# Patient Record
Sex: FEMALE | URBAN - METROPOLITAN AREA
[De-identification: names, ages, dates, MRNs, and addresses within clinical notes are randomized per-mention and may not be internally consistent; named-entity substitution may affect disease eponyms.]

---

## 2019-01-17 ENCOUNTER — APPOINTMENT (RX ONLY)
Dept: URBAN - METROPOLITAN AREA CLINIC 45 | Facility: CLINIC | Age: 19
Setting detail: DERMATOLOGY
End: 2019-01-17

## 2019-01-17 DIAGNOSIS — Z79.899 OTHER LONG TERM (CURRENT) DRUG THERAPY: ICD-10-CM

## 2019-01-17 DIAGNOSIS — L81.4 OTHER MELANIN HYPERPIGMENTATION: ICD-10-CM

## 2019-01-17 DIAGNOSIS — L70.0 ACNE VULGARIS: ICD-10-CM

## 2019-01-17 PROCEDURE — ? PRESCRIPTION

## 2019-01-17 PROCEDURE — ? COUNSELING

## 2019-01-17 PROCEDURE — ? ORDER TESTS

## 2019-01-17 PROCEDURE — 99203 OFFICE O/P NEW LOW 30 MIN: CPT

## 2019-01-17 PROCEDURE — ? DIAGNOSIS COMMENT

## 2019-01-17 PROCEDURE — ? HIGH RISK MEDICATION MONITORING

## 2019-01-17 PROCEDURE — ? TREATMENT REGIMEN

## 2019-01-17 RX ORDER — PHARMACY COMPOUNDING ACCESSORY
EACH MISCELLANEOUS
Qty: 1 | Refills: 3 | Status: ERX | COMMUNITY
Start: 2019-01-17

## 2019-01-17 RX ORDER — MINOCYCLINE HYDROCHLORIDE 105 MG/1
TABLET, FILM COATED, EXTENDED RELEASE ORAL
Qty: 30 | Refills: 3 | Status: ERX | COMMUNITY
Start: 2019-01-17

## 2019-01-17 RX ORDER — SULFACETAMIDE SODIUM, SULFUR 100; 20 MG/G; MG/G
EMULSION TOPICAL
Qty: 1 | Refills: 4 | Status: ERX | COMMUNITY
Start: 2019-01-17

## 2019-01-17 RX ADMIN — MINOCYCLINE HYDROCHLORIDE: 105 TABLET, FILM COATED, EXTENDED RELEASE ORAL at 00:00

## 2019-01-17 RX ADMIN — SULFACETAMIDE SODIUM, SULFUR: 100; 20 EMULSION TOPICAL at 00:00

## 2019-01-17 RX ADMIN — Medication: at 00:00

## 2019-01-17 ASSESSMENT — LOCATION ZONE DERM: LOCATION ZONE: FACE

## 2019-01-17 ASSESSMENT — LOCATION SIMPLE DESCRIPTION DERM
LOCATION SIMPLE: RIGHT CHEEK
LOCATION SIMPLE: LEFT CHEEK

## 2019-01-17 ASSESSMENT — LOCATION DETAILED DESCRIPTION DERM
LOCATION DETAILED: LEFT CENTRAL MALAR CHEEK
LOCATION DETAILED: RIGHT CENTRAL MALAR CHEEK

## 2019-01-17 NOTE — PROCEDURE: ORDER TESTS
Performing Laboratory: [id number]
Billing Type: Third-Party Bill
Expected Date Of Service: 01/17/2019
Bill For Surgical Tray: no
Lab Facility: [id number]

## 2019-01-17 NOTE — PROCEDURE: TREATMENT REGIMEN
Plan: Location: face\\nDuration: enrolling in iPledge\\nMethod of Contraception: under skin implant and MLC\\nPrescribed: Solodyn 105mg po qd x 30 days\\n                    Avar Wash (2-5 mintues) qd x 30 days\\n                    Tretinoin Bleaching Cream qhs x 30 days\\nPharmacy: [facility]\\niPledge: [phone]\\n\\nDiscussed with patient that this is an immune mediated condition triggered with stress, lack of sleep, and also has a major genetic component\\nEducated patient on Accutane 6 month treatment course, side effects, and iPledge program/requirements (2 negative pregnancy tests 30 days apart required)\\nCommon side effects from therapy: dryness, joint pain, mood changes, headaches, nose bleeds\\nDiscussed with patient that Accutane is a Vitamin A derivative\\nDiscussed with patient that Accutane obliterates oil glands and a cure for acne vs. antibiotics which is a temporary treatment\\nDiscussed with patient that medication is processed by the liver and requires blood work to monitor liver functions\\nPt discussed that she is tired of acne and wants it gone.\\nWill enroll pt into ipledge today, discussed with pt that she has to wait 30 days until we can send in prescription.\\nWill prescribe Solodybn 105mg, Avar Wash, and Tretinoin Bleaching Cream today to help relieve inflammation until f/u.\\nLab slips given.\\n\\nIPLEDGE program and consent form discussed with patient. The side effects and warnings for the use of Accutane were discussed with the patient. She understands she can not donate blood for 1 month post tx and can not have laser tx, cosmetic surgery, waxing or peels for 6 months post treatment.\\n\\nDiscussed musculoskeletal SE’s in detail. Discussed HA’s, dry skin, and depression in detail. Patient denies depression. Denies personal or family H/O Crohns, IBS, IBD, or bloody stools.\\n\\nUnderstand the need to fill Rx in 6 days and have monthly labs and OV.\\nF/u in 1 month
Detail Level: Zone
Plan: Location: face\\nPrescribed: HQ 4% Tretinoin 0.05% compound cream QHS\\n\\nPt has hyperpigmentation on face today.\\nDiscussed with pt that this pigmentation is due to sun damage to the skin, advised patient to use SPF daily.\\nDiscussed with pt that we will start patient on HQ 4% Tretinoin 0.05% to use nightly.\\nDiscussed with pt to apply a pea size amount to face, pushing into pores. \\nDiscussed with pt to avoid areas around eyes, nasal crease, and around the lips since skin is thin and may get irritated easily.\\n\\nDiscussed with pt that it will come in a box that says keep refrigerated.\\nDiscussed with pt that they do not need to keep it refrigerated, but have to keep it in a dark place since sunlight may oxidize it.\\nDiscussed with pt that if skin becomes irritated while using cream, then pt can change frequency to every other night, or every other other night, etc.\\nDiscussed with pt that results are not immediate and may take up to a month to notice change.\\nAdvised pt to apply a moisturizing cream such as Cerave afterwards to help reestablish a healthy skin barrier.\\nF/u as needed

## 2019-01-22 ENCOUNTER — RX ONLY (OUTPATIENT)
Age: 19
Setting detail: RX ONLY
End: 2019-01-22

## 2019-01-22 RX ORDER — MINOCYCLINE HYDROCHLORIDE 100 MG/1
CAPSULE ORAL
Qty: 60 | Refills: 2 | Status: ERX | COMMUNITY
Start: 2019-01-22

## 2019-01-23 VITALS — WEIGHT: 140 LBS | HEIGHT: 64 IN

## 2019-02-21 ENCOUNTER — APPOINTMENT (RX ONLY)
Dept: URBAN - METROPOLITAN AREA CLINIC 45 | Facility: CLINIC | Age: 19
Setting detail: DERMATOLOGY
End: 2019-02-21

## 2019-02-21 DIAGNOSIS — L70.0 ACNE VULGARIS: ICD-10-CM

## 2019-02-21 DIAGNOSIS — L81.4 OTHER MELANIN HYPERPIGMENTATION: ICD-10-CM

## 2019-02-21 PROCEDURE — ? PRESCRIPTION

## 2019-02-21 PROCEDURE — ? COUNSELING

## 2019-02-21 PROCEDURE — ? DIAGNOSIS COMMENT

## 2019-02-21 PROCEDURE — 99213 OFFICE O/P EST LOW 20 MIN: CPT

## 2019-02-21 PROCEDURE — ? TREATMENT REGIMEN

## 2019-02-21 RX ORDER — SPIRONOLACTONE 100 MG/1
TABLET, FILM COATED ORAL
Qty: 30 | Refills: 4 | Status: ERX | COMMUNITY
Start: 2019-02-21

## 2019-02-21 RX ORDER — DOXYCYCLINE HYCLATE 200 MG/1
TABLET, DELAYED RELEASE ORAL
Qty: 30 | Refills: 1 | Status: ERX | COMMUNITY
Start: 2019-02-21

## 2019-02-21 RX ORDER — SULFACETAMIDE SODIUM, SULFUR 98; 48 MG/G; MG/G
LIQUID TOPICAL
Qty: 1 | Refills: 4 | Status: ERX | COMMUNITY
Start: 2019-02-21

## 2019-02-21 RX ADMIN — SULFACETAMIDE SODIUM, SULFUR: 98; 48 LIQUID TOPICAL at 21:16

## 2019-02-21 RX ADMIN — SPIRONOLACTONE: 100 TABLET, FILM COATED ORAL at 21:16

## 2019-02-21 RX ADMIN — DOXYCYCLINE HYCLATE: 200 TABLET, DELAYED RELEASE ORAL at 21:17

## 2019-02-21 ASSESSMENT — LOCATION ZONE DERM: LOCATION ZONE: FACE

## 2019-02-21 ASSESSMENT — LOCATION DETAILED DESCRIPTION DERM
LOCATION DETAILED: LEFT CENTRAL MALAR CHEEK
LOCATION DETAILED: RIGHT CENTRAL MALAR CHEEK

## 2019-02-21 ASSESSMENT — LOCATION SIMPLE DESCRIPTION DERM
LOCATION SIMPLE: RIGHT CHEEK
LOCATION SIMPLE: LEFT CHEEK

## 2019-02-21 NOTE — PROCEDURE: TREATMENT REGIMEN
Plan: Location: Face\\n*Patent is enrolled in iPledge but would not like to start Accutane today\\n\\nMethod of Contraception: under skin implant and MLC\\n\\nTreatment:  Spironolactone 100mg po QD\\n                    Doryx 200mg po QD\\n                    Plexion Cleanser (2-5 mintues) qd x 30 days\\n                    Tretinoin Bleaching Cream qhs x 30 days\\n\\nPharmacy: [facility]\\niPledge: [phone]\\n\\nPatient is here for follow up and was going to start Accutane today. \\nAfter doing some research, patient has decided that she would like to hold off on starting Accutane since she does not feel like she is ready yet. \\nPatient has been taking Minocycline BID since her last visit, since Solodyn was not affordable, and states she hasn't noticed much of an improvement. \\nHad long discussion with patient about the risks of long term use of Minocycline and discussed that this is not a regimen that we can keep her on forever. \\nFor now, we will switch to Doryx 200mg po QD and add Spironolactone to her skin regimen. \\nDiscussed with patient that at her next follow up, I have a strong feeling that she will want to start Accutane since she would have tried/failed all other medications. \\n\\nDiscussed with patient that this is an immune mediated condition triggered with stress, lack of sleep, and also has a major genetic component\\nEducated patient on Accutane 6 month treatment course, side effects, and iPledge program/requirements (2 negative pregnancy tests 30 days apart required)\\nCommon side effects from therapy: dryness, joint pain, mood changes, headaches, nose bleeds\\nDiscussed with patient that Accutane is a Vitamin A derivative\\nDiscussed with patient that Accutane obliterates oil glands and a cure for acne vs. antibiotics which is a temporary treatment\\nDiscussed with patient that medication is processed by the liver and requires blood work to monitor liver functions\\nPt discussed that she is tired of acne and wants it gone.\\nWill keep patient enrolled in ipledge today.\\nLab slips given.\\n\\nIPLEDGE program and consent form discussed with patient. The side effects and warnings for the use of Accutane were discussed with the patient. She understands she can not donate blood for 1 month post tx and can not have laser tx, cosmetic surgery, waxing or peels for 6 months post treatment.\\n\\nDiscussed musculoskeletal SE’s in detail. Discussed HA’s, dry skin, and depression in detail. Patient denies depression. Denies personal or family H/O Crohns, IBS, IBD, or bloody stools.\\n\\nUnderstand the need to fill Rx in 6 days and have monthly labs and OV.\\nF/u in 1 month
Detail Level: Zone
Plan: Location: face\\nPrescribed: HQ 4% Tretinoin 0.05% compound cream QHS\\n\\nPt has hyperpigmentation on face today.\\nDiscussed with pt that this pigmentation is due to sun damage to the skin, advised patient to use SPF daily.\\nDiscussed with pt that we will start patient on HQ 4% Tretinoin 0.05% to use nightly.\\nDiscussed with pt to apply a pea size amount to face, pushing into pores. \\nDiscussed with pt to avoid areas around eyes, nasal crease, and around the lips since skin is thin and may get irritated easily.\\n\\nDiscussed with pt that it will come in a box that says keep refrigerated.\\nDiscussed with pt that they do not need to keep it refrigerated, but have to keep it in a dark place since sunlight may oxidize it.\\nDiscussed with pt that if skin becomes irritated while using cream, then pt can change frequency to every other night, or every other other night, etc.\\nDiscussed with pt that results are not immediate and may take up to a month to notice change.\\nAdvised pt to apply a moisturizing cream such as Cerave afterwards to help reestablish a healthy skin barrier.\\nF/u as needed

## 2019-02-21 NOTE — PROCEDURE: DIAGNOSIS COMMENT
Comment: Nodulocystic Acne (Enrolled in IPLEDGE but NOT starting medication today)
Detail Level: Simple

## 2019-03-21 ENCOUNTER — RX ONLY (OUTPATIENT)
Age: 19
Setting detail: RX ONLY
End: 2019-03-21

## 2019-03-21 ENCOUNTER — APPOINTMENT (RX ONLY)
Dept: URBAN - METROPOLITAN AREA CLINIC 45 | Facility: CLINIC | Age: 19
Setting detail: DERMATOLOGY
End: 2019-03-21

## 2019-03-21 DIAGNOSIS — L70.0 ACNE VULGARIS: ICD-10-CM

## 2019-03-21 DIAGNOSIS — L81.4 OTHER MELANIN HYPERPIGMENTATION: ICD-10-CM

## 2019-03-21 PROCEDURE — 99213 OFFICE O/P EST LOW 20 MIN: CPT

## 2019-03-21 PROCEDURE — ? TREATMENT REGIMEN

## 2019-03-21 PROCEDURE — ? COUNSELING

## 2019-03-21 PROCEDURE — ? DIAGNOSIS COMMENT

## 2019-03-21 RX ORDER — SARECYCLINE HYDROCHLORIDE 100 MG/1
TABLET, COATED ORAL
Qty: 30 | Refills: 3 | Status: ERX | COMMUNITY
Start: 2019-03-21

## 2019-03-21 ASSESSMENT — LOCATION SIMPLE DESCRIPTION DERM
LOCATION SIMPLE: LEFT CHEEK
LOCATION SIMPLE: RIGHT CHEEK

## 2019-03-21 ASSESSMENT — LOCATION DETAILED DESCRIPTION DERM
LOCATION DETAILED: LEFT CENTRAL MALAR CHEEK
LOCATION DETAILED: RIGHT CENTRAL MALAR CHEEK

## 2019-03-21 ASSESSMENT — LOCATION ZONE DERM: LOCATION ZONE: FACE

## 2019-03-21 NOTE — PROCEDURE: TREATMENT REGIMEN
Plan: Location: Face\\n*Patent is enrolled in iPledge but would not like to start Accutane today\\n\\nMethod of Contraception: under skin implant and MLC\\n\\nTreatment:  Spironolactone 100mg po QD\\n                    Doryx 200mg po QD\\n                    Plexion Cleanser (2-5 mintues) qd x 30 days\\n                    Tretinoin Bleaching Cream qhs x 30 days\\n\\nPharmacy: [facility]\\niPledge: [phone]\\n\\nPatient is here for follow up and was going to start Accutane today. \\nAfter doing some research, patient has decided that she would like to hold off on starting Accutane since she does not feel like she is ready yet. \\nPatient has been taking Minocycline BID since her last visit, since Solodyn was not affordable, and states she hasn't noticed much of an improvement. \\nHad long discussion with patient about the risks of long term use of Minocycline and discussed that this is not a regimen that we can keep her on forever. \\nFor now, we will switch to Doryx 200mg po QD and add Spironolactone to her skin regimen. \\nDiscussed with patient that at her next follow up, I have a strong feeling that she will want to start Accutane since she would have tried/failed all other medications. \\n\\nDiscussed with patient that this is an immune mediated condition triggered with stress, lack of sleep, and also has a major genetic component\\nEducated patient on Accutane 6 month treatment course, side effects, and iPledge program/requirements (2 negative pregnancy tests 30 days apart required)\\nCommon side effects from therapy: dryness, joint pain, mood changes, headaches, nose bleeds\\nDiscussed with patient that Accutane is a Vitamin A derivative\\nDiscussed with patient that Accutane obliterates oil glands and a cure for acne vs. antibiotics which is a temporary treatment\\nDiscussed with patient that medication is processed by the liver and requires blood work to monitor liver functions\\nPt discussed that she is tired of acne and wants it gone.\\nWill keep patient enrolled in ipledge today.\\nLab slips given.\\n\\nIPLEDGE program and consent form discussed with patient. The side effects and warnings for the use of Accutane were discussed with the patient. She understands she can not donate blood for 1 month post tx and can not have laser tx, cosmetic surgery, waxing or peels for 6 months post treatment.\\n\\nDiscussed musculoskeletal SE’s in detail. Discussed HA’s, dry skin, and depression in detail. Patient denies depression. Denies personal or family H/O Crohns, IBS, IBD, or bloody stools.\\n\\nUnderstand the need to fill Rx in 6 days and have monthly labs and OV.\\nF/u in 1 month
Detail Level: Zone
Plan: Location: face\\nPrescribed: HQ 4% Tretinoin 0.05% compound cream QHS\\n\\nPt has hyperpigmentation on face today.\\nDiscussed with pt that this pigmentation is due to sun damage to the skin, advised patient to use SPF daily.\\nDiscussed with pt that we will start patient on HQ 4% Tretinoin 0.05% to use nightly.\\nDiscussed with pt to apply a pea size amount to face, pushing into pores. \\nDiscussed with pt to avoid areas around eyes, nasal crease, and around the lips since skin is thin and may get irritated easily.\\n\\nDiscussed with pt that it will come in a box that says keep refrigerated.\\nDiscussed with pt that they do not need to keep it refrigerated, but have to keep it in a dark place since sunlight may oxidize it.\\nDiscussed with pt that if skin becomes irritated while using cream, then pt can change frequency to every other night, or every other other night, etc.\\nDiscussed with pt that results are not immediate and may take up to a month to notice change.\\nAdvised pt to apply a moisturizing cream such as Cerave afterwards to help reestablish a healthy skin barrier.\\nF/u as needed

## 2019-04-12 ENCOUNTER — APPOINTMENT (RX ONLY)
Dept: URBAN - METROPOLITAN AREA CLINIC 45 | Facility: CLINIC | Age: 19
Setting detail: DERMATOLOGY
End: 2019-04-12

## 2019-04-12 DIAGNOSIS — Z41.9 ENCOUNTER FOR PROCEDURE FOR PURPOSES OTHER THAN REMEDYING HEALTH STATE, UNSPECIFIED: ICD-10-CM

## 2019-04-12 PROCEDURE — ? ACNE SURGERY COSMETIC

## 2019-04-12 ASSESSMENT — LOCATION DETAILED DESCRIPTION DERM
LOCATION DETAILED: LEFT INFERIOR CENTRAL MALAR CHEEK
LOCATION DETAILED: INFERIOR MID FOREHEAD
LOCATION DETAILED: LEFT LOWER CUTANEOUS LIP
LOCATION DETAILED: RIGHT INFERIOR CENTRAL MALAR CHEEK

## 2019-04-12 ASSESSMENT — LOCATION SIMPLE DESCRIPTION DERM
LOCATION SIMPLE: INFERIOR FOREHEAD
LOCATION SIMPLE: LEFT CHEEK
LOCATION SIMPLE: LEFT LIP
LOCATION SIMPLE: RIGHT CHEEK

## 2019-04-12 ASSESSMENT — LOCATION ZONE DERM
LOCATION ZONE: FACE
LOCATION ZONE: LIP

## 2019-04-12 NOTE — PROCEDURE: ACNE SURGERY COSMETIC
Consent was obtained and risks were reviewed including but not limited to scarring, infection, bleeding, scabbing, incomplete removal, and allergy to anesthesia.
Render Post-Care Instructions In Note?: no
Extraction Method: 11 blade and q-tip
Price (Use Numbers Only, No Special Characters Or $): 75
Prep Text (Optional): Prior to removal the treatment areas were prepped in the usual fashion.
Acne Type: Comedonal Lesions
Detail Level: Generalized
Post-Care Instructions: I reviewed with the patient in detail post-care instructions.

## 2019-04-26 ENCOUNTER — APPOINTMENT (RX ONLY)
Dept: URBAN - METROPOLITAN AREA CLINIC 45 | Facility: CLINIC | Age: 19
Setting detail: DERMATOLOGY
End: 2019-04-26

## 2019-04-26 DIAGNOSIS — Z41.9 ENCOUNTER FOR PROCEDURE FOR PURPOSES OTHER THAN REMEDYING HEALTH STATE, UNSPECIFIED: ICD-10-CM

## 2019-04-26 PROCEDURE — ? CHEMICAL PEEL

## 2019-04-26 ASSESSMENT — LOCATION DETAILED DESCRIPTION DERM
LOCATION DETAILED: LEFT INFERIOR CENTRAL MALAR CHEEK
LOCATION DETAILED: LEFT LOWER CUTANEOUS LIP
LOCATION DETAILED: RIGHT INFERIOR MEDIAL FOREHEAD
LOCATION DETAILED: RIGHT INFERIOR CENTRAL MALAR CHEEK

## 2019-04-26 ASSESSMENT — LOCATION SIMPLE DESCRIPTION DERM
LOCATION SIMPLE: RIGHT CHEEK
LOCATION SIMPLE: RIGHT FOREHEAD
LOCATION SIMPLE: LEFT CHEEK
LOCATION SIMPLE: LEFT LIP

## 2019-04-26 ASSESSMENT — LOCATION ZONE DERM
LOCATION ZONE: LIP
LOCATION ZONE: FACE

## 2019-04-26 NOTE — PROCEDURE: CHEMICAL PEEL
Number Of Layers: 2
Consent: Prior to the procedure, written consent was obtained and risks were reviewed, including but not limited to: redness, peeling, blistering, pigmentary change, scarring, infection, and pain.
Post Peel Care: After the procedure, a post-peel cream was applied to the treated areas. Sun protection and post-care instructions were reviewed with the patient.
Detail Level: Generalized
Treatment Time (Optional): 30 mins
Price (Use Numbers Only, No Special Characters Or $): 95.00
Post-Care Instructions: I reviewed with the patient in detail post-care instructions. Patient should avoid sun exposure and wear sun protection.
Prep: The treated area was degreased with pre-peel cleanser, and vaseline was applied for protection of mucous membranes.
Chemical Peel: TCA 10%
no

## 2019-09-12 ENCOUNTER — RX ONLY (OUTPATIENT)
Age: 19
Setting detail: RX ONLY
End: 2019-09-12

## 2019-09-12 ENCOUNTER — APPOINTMENT (RX ONLY)
Dept: URBAN - METROPOLITAN AREA CLINIC 45 | Facility: CLINIC | Age: 19
Setting detail: DERMATOLOGY
End: 2019-09-12

## 2019-09-12 DIAGNOSIS — L70.0 ACNE VULGARIS: ICD-10-CM

## 2019-09-12 DIAGNOSIS — L81.4 OTHER MELANIN HYPERPIGMENTATION: ICD-10-CM

## 2019-09-12 PROCEDURE — ? DIAGNOSIS COMMENT

## 2019-09-12 PROCEDURE — ? COUNSELING

## 2019-09-12 PROCEDURE — ? TREATMENT REGIMEN

## 2019-09-12 PROCEDURE — 99214 OFFICE O/P EST MOD 30 MIN: CPT

## 2019-09-12 RX ORDER — SPIRONOLACTONE 100 MG/1
TABLET, FILM COATED ORAL
Qty: 30 | Refills: 5 | Status: ERX

## 2019-09-12 RX ORDER — SARECYCLINE HYDROCHLORIDE 100 MG/1
TABLET, COATED ORAL
Qty: 30 | Refills: 4 | Status: ERX | COMMUNITY
Start: 2019-09-12

## 2019-09-12 ASSESSMENT — LOCATION SIMPLE DESCRIPTION DERM
LOCATION SIMPLE: LEFT CHEEK
LOCATION SIMPLE: RIGHT CHEEK

## 2019-09-12 ASSESSMENT — LOCATION DETAILED DESCRIPTION DERM
LOCATION DETAILED: RIGHT CENTRAL MALAR CHEEK
LOCATION DETAILED: LEFT CENTRAL MALAR CHEEK

## 2019-09-12 ASSESSMENT — LOCATION ZONE DERM: LOCATION ZONE: FACE

## 2019-09-12 NOTE — PROCEDURE: TREATMENT REGIMEN
Detail Level: Zone
Plan: Location: Face\\n*Patent is enrolled in iPledge but would not like to start Accutane today\\n\\nMethod of Contraception: under skin implant and MLC\\n\\nTreatment:  Spironolactone 100mg po QD\\n                    Doryx 200mg po QD\\n                    Plexion Cleanser (2-5 mintues) qd x 30 days\\n                    Tretinoin Bleaching Cream qhs x 30 days\\n\\nPharmacy: [facility]\\niPledge: [phone]\\n\\nPatient is here for follow up and was going to start Accutane today. \\nAfter doing some research, patient has decided that she would like to hold off on starting Accutane since she does not feel like she is ready yet. \\nPatient has been taking Minocycline BID since her last visit, since Solodyn was not affordable, and states she hasn't noticed much of an improvement. \\nHad long discussion with patient about the risks of long term use of Minocycline and discussed that this is not a regimen that we can keep her on forever. \\nFor now, we will switch to Doryx 200mg po QD and add Spironolactone to her skin regimen. \\nDiscussed with patient that at her next follow up, I have a strong feeling that she will want to start Accutane since she would have tried/failed all other medications. \\n\\nDiscussed with patient that this is an immune mediated condition triggered with stress, lack of sleep, and also has a major genetic component\\nEducated patient on Accutane 6 month treatment course, side effects, and iPledge program/requirements (2 negative pregnancy tests 30 days apart required)\\nCommon side effects from therapy: dryness, joint pain, mood changes, headaches, nose bleeds\\nDiscussed with patient that Accutane is a Vitamin A derivative\\nDiscussed with patient that Accutane obliterates oil glands and a cure for acne vs. antibiotics which is a temporary treatment\\nDiscussed with patient that medication is processed by the liver and requires blood work to monitor liver functions\\nPt discussed that she is tired of acne and wants it gone.\\nWill keep patient enrolled in ipledge today.\\nLab slips given.\\n\\nIPLEDGE program and consent form discussed with patient. The side effects and warnings for the use of Accutane were discussed with the patient. She understands she can not donate blood for 1 month post tx and can not have laser tx, cosmetic surgery, waxing or peels for 6 months post treatment.\\n\\nDiscussed musculoskeletal SE’s in detail. Discussed HA’s, dry skin, and depression in detail. Patient denies depression. Denies personal or family H/O Crohns, IBS, IBD, or bloody stools.\\n\\nUnderstand the need to fill Rx in 6 days and have monthly labs and OV.\\nF/u in 1 month
Plan: Location: face\\nPrescribed: HQ 4% Tretinoin 0.05% compound cream QHS\\n\\nPt has hyperpigmentation on face today.\\nDiscussed with pt that this pigmentation is due to sun damage to the skin, advised patient to use SPF daily.\\nDiscussed with pt that we will start patient on HQ 4% Tretinoin 0.05% to use nightly.\\nDiscussed with pt to apply a pea size amount to face, pushing into pores. \\nDiscussed with pt to avoid areas around eyes, nasal crease, and around the lips since skin is thin and may get irritated easily.\\n\\nDiscussed with pt that it will come in a box that says keep refrigerated.\\nDiscussed with pt that they do not need to keep it refrigerated, but have to keep it in a dark place since sunlight may oxidize it.\\nDiscussed with pt that if skin becomes irritated while using cream, then pt can change frequency to every other night, or every other other night, etc.\\nDiscussed with pt that results are not immediate and may take up to a month to notice change.\\nAdvised pt to apply a moisturizing cream such as Cerave afterwards to help reestablish a healthy skin barrier.\\nF/u as needed

## 2020-04-08 ENCOUNTER — APPOINTMENT (RX ONLY)
Dept: URBAN - METROPOLITAN AREA CLINIC 45 | Facility: CLINIC | Age: 20
Setting detail: DERMATOLOGY
End: 2020-04-08

## 2020-04-08 DIAGNOSIS — L81.4 OTHER MELANIN HYPERPIGMENTATION: ICD-10-CM

## 2020-04-08 DIAGNOSIS — L70.0 ACNE VULGARIS: ICD-10-CM

## 2020-04-08 PROCEDURE — ? PRESCRIPTION

## 2020-04-08 PROCEDURE — ? DIAGNOSIS COMMENT

## 2020-04-08 PROCEDURE — ? TREATMENT REGIMEN

## 2020-04-08 PROCEDURE — 99213 OFFICE O/P EST LOW 20 MIN: CPT

## 2020-04-08 PROCEDURE — ? COUNSELING

## 2020-04-08 ASSESSMENT — LOCATION ZONE DERM: LOCATION ZONE: FACE

## 2020-04-08 ASSESSMENT — LOCATION DETAILED DESCRIPTION DERM
LOCATION DETAILED: RIGHT CENTRAL MALAR CHEEK
LOCATION DETAILED: LEFT CENTRAL MALAR CHEEK

## 2020-04-08 ASSESSMENT — LOCATION SIMPLE DESCRIPTION DERM
LOCATION SIMPLE: RIGHT CHEEK
LOCATION SIMPLE: LEFT CHEEK

## 2020-04-08 NOTE — PROCEDURE: DIAGNOSIS COMMENT
Detail Level: Simple
Comment: Nodulocystic Acne (Enrolled in IPLEDGE but NOT starting medication today)

## 2020-04-08 NOTE — PROCEDURE: TREATMENT REGIMEN
Plan: ********TELEDERM CONSULT*******\\nLocation: Face\\n*Patent is enrolled in iPledge but would not like to start Accutane today 04/08/2020\\nMethod of Contraception: under skin implant and MLC\\nTreatment:  Spironolactone 100mg po QD, Doryx 200mg po QD, Clindamycin ETZ Kit\\nPharmacy: [facility]\\niPledge: [phone]                    \\nFailed: Plexion Topical Cleanser and Pharmacy Compound Bleaching Cream\\n\\n\\nFollow up\\nLOV 09/12/2019\\nNo Photos taken \\n\\nPatient comes in today for further evaluation via [facility].\\nPt states she is still experiencing breakouts.\\nShe is only taking the Spironolactone 100mg daily and Doryx 200mg daily.\\nShe states she felt no improvement using the Plexion Topical Cleanser, she tried it for a month.\\nShe has been using the Cerave Benzoyl Peroxide Foam Cleanser and a Retin A OTC and discontinued the Compound Bleaching Cream.\\nMost of her acne is on her jaw line and chin area.\\nPt states she will be changing birth control in a few months\\n\\nDiscussed with pt:\\nHer skin is still not clear, and there is plenty of activity of cystic acne happening.\\nI will add to her current regimen the  Clindacin ETZ 1% Topical Kit Wipes use them at night mid day or in the AM\\nIf she is breaking out in three months we will discuss Accutane again\\nI would like to transition the Seysara in three months to start a prn regimen.\\nWhile on Spironolactone no babies, must stop immediately.\\nOTC may continue and with the change of events may clear her\\nGoal is to always to make her clear tied to her goals, when ever she is ready we will educate again in full detail\\nReiterated about acne hormonal and genetic and stress level and lack of sleep.\\nPt states understanding and will follow up in 3 month.\\n\\nFollow up 3 months.\\n----------------------------------------------------------------------------------\\Marco Antonio is here for follow up and was going to start Accutane today. \\nAfter doing some research, patient has decided that she would like to hold off on starting Accutane since she does not feel like she is ready yet. \\nPkatina has been taking Minocycline BID since her last visit, since Solodyn was not affordable, and states she hasn't noticed much of an improvement. \\nHad long discussion with patient about the risks of long term use of Minocycline and discussed that this is not a regimen that we can keep her on forever. \\nFor now, we will switch to Doryx 200mg po QD and add Spironolactone to her skin regimen. \\nDiscussed with patient that at her next follow up, I have a strong feeling that she will want to start Accutane since she would have tried/failed all other medications. \\n\\nDiscussed with patient that this is an immune mediated condition triggered with stress, lack of sleep, and also has a major genetic component\\nEducated patient on Accutane 6 month treatment course, side effects, and iPledge program/requirements (2 negative pregnancy tests 30 days apart required)\\nCommon side effects from therapy: dryness, joint pain, mood changes, headaches, nose bleeds\\nDiscussed with patient that Accutane is a Vitamin A derivative\\nDiscussed with patient that Accutane obliterates oil glands and a cure for acne vs. antibiotics which is a temporary treatment\\nDiscussed with patient that medication is processed by the liver and requires blood work to monitor liver functions\\nPt discussed that she is tired of acne and wants it gone.\\nWill keep patient enrolled in ipledge today.\\nLab slips given.\\n\\nIPLEDGE program and consent form discussed with patient. The side effects and warnings for the use of Accutane were discussed with the patient. She understands she can not donate blood for 1 month post tx and can not have laser tx, cosmetic surgery, waxing or peels for 6 months post treatment.\\n\\nDiscussed musculoskeletal SE’s in detail. Discussed HA’s, dry skin, and depression in detail. Patient denies depression. Denies personal or family H/O Crohns, IBS, IBD, or bloody stools.\\n\\nUnderstand the need to fill Rx in 6 days and have monthly labs and OV.\\nF/u in 1 month
Detail Level: Zone
Plan: Location: face\\nPrescribed: HQ 4% Tretinoin 0.05% compound cream QHS\\n\\nPt has hyperpigmentation on face today.\\nDiscussed with pt that this pigmentation is due to sun damage to the skin, advised patient to use SPF daily.\\nDiscussed with pt that we will start patient on HQ 4% Tretinoin 0.05% to use nightly.\\nDiscussed with pt to apply a pea size amount to face, pushing into pores. \\nDiscussed with pt to avoid areas around eyes, nasal crease, and around the lips since skin is thin and may get irritated easily.\\n\\nDiscussed with pt that it will come in a box that says keep refrigerated.\\nDiscussed with pt that they do not need to keep it refrigerated, but have to keep it in a dark place since sunlight may oxidize it.\\nDiscussed with pt that if skin becomes irritated while using cream, then pt can change frequency to every other night, or every other other night, etc.\\nDiscussed with pt that results are not immediate and may take up to a month to notice change.\\nAdvised pt to apply a moisturizing cream such as Cerave afterwards to help reestablish a healthy skin barrier.\\nF/u as needed

## 2020-04-15 ENCOUNTER — RX ONLY (OUTPATIENT)
Age: 20
Setting detail: RX ONLY
End: 2020-04-15

## 2020-04-15 RX ORDER — CLINDAMYCIN PHOSPHATE 1 %
KIT TOPICAL
Qty: 30 | Refills: 4 | Status: ERX | COMMUNITY
Start: 2020-04-15

## 2020-04-15 RX ADMIN — CLINDAMYCIN PHOSPHATE: KIT at 00:00
